# Patient Record
Sex: FEMALE | Race: WHITE | NOT HISPANIC OR LATINO | Employment: FULL TIME | URBAN - METROPOLITAN AREA
[De-identification: names, ages, dates, MRNs, and addresses within clinical notes are randomized per-mention and may not be internally consistent; named-entity substitution may affect disease eponyms.]

---

## 2020-12-27 ENCOUNTER — HOSPITAL ENCOUNTER (EMERGENCY)
Facility: HOSPITAL | Age: 60
Discharge: HOME | End: 2020-12-27
Attending: EMERGENCY MEDICINE | Admitting: EMERGENCY MEDICINE

## 2020-12-27 ENCOUNTER — APPOINTMENT (OUTPATIENT)
Dept: CT IMAGING | Facility: HOSPITAL | Age: 60
End: 2020-12-27

## 2020-12-27 VITALS
WEIGHT: 166 LBS | OXYGEN SATURATION: 99 % | BODY MASS INDEX: 27.66 KG/M2 | RESPIRATION RATE: 20 BRPM | HEART RATE: 81 BPM | TEMPERATURE: 98 F | HEIGHT: 65 IN | DIASTOLIC BLOOD PRESSURE: 93 MMHG | SYSTOLIC BLOOD PRESSURE: 147 MMHG

## 2020-12-27 DIAGNOSIS — R10.9 LEFT FLANK PAIN: Primary | ICD-10-CM

## 2020-12-27 LAB
ANION GAP SERPL CALCULATED.3IONS-SCNC: 10 MMOL/L (ref 5–15)
BACTERIA UR QL AUTO: ABNORMAL /HPF
BASOPHILS # BLD AUTO: 0 10*3/MM3 (ref 0–0.2)
BASOPHILS NFR BLD AUTO: 0.4 % (ref 0–1.5)
BILIRUB UR QL STRIP: NEGATIVE
BUN SERPL-MCNC: 17 MG/DL (ref 8–23)
BUN/CREAT SERPL: 23.9 (ref 7–25)
CALCIUM SPEC-SCNC: 9.4 MG/DL (ref 8.6–10.5)
CHLORIDE SERPL-SCNC: 100 MMOL/L (ref 98–107)
CLARITY UR: CLEAR
CO2 SERPL-SCNC: 27 MMOL/L (ref 22–29)
COLOR UR: YELLOW
CREAT SERPL-MCNC: 0.71 MG/DL (ref 0.57–1)
DEPRECATED RDW RBC AUTO: 42.4 FL (ref 37–54)
EOSINOPHIL # BLD AUTO: 0.1 10*3/MM3 (ref 0–0.4)
EOSINOPHIL NFR BLD AUTO: 0.7 % (ref 0.3–6.2)
ERYTHROCYTE [DISTWIDTH] IN BLOOD BY AUTOMATED COUNT: 13.2 % (ref 12.3–15.4)
GFR SERPL CREATININE-BSD FRML MDRD: 101 ML/MIN/1.73
GFR SERPL CREATININE-BSD FRML MDRD: 83 ML/MIN/1.73
GLUCOSE SERPL-MCNC: 101 MG/DL (ref 65–99)
GLUCOSE UR STRIP-MCNC: NEGATIVE MG/DL
HCT VFR BLD AUTO: 37.5 % (ref 34–46.6)
HGB BLD-MCNC: 12.5 G/DL (ref 12–15.9)
HGB UR QL STRIP.AUTO: NEGATIVE
HOLD SPECIMEN: NORMAL
HYALINE CASTS UR QL AUTO: ABNORMAL /LPF
KETONES UR QL STRIP: NEGATIVE
LEUKOCYTE ESTERASE UR QL STRIP.AUTO: NEGATIVE
LYMPHOCYTES # BLD AUTO: 2.4 10*3/MM3 (ref 0.7–3.1)
LYMPHOCYTES NFR BLD AUTO: 20.6 % (ref 19.6–45.3)
MCH RBC QN AUTO: 30.7 PG (ref 26.6–33)
MCHC RBC AUTO-ENTMCNC: 33.2 G/DL (ref 31.5–35.7)
MCV RBC AUTO: 92.5 FL (ref 79–97)
MONOCYTES # BLD AUTO: 0.9 10*3/MM3 (ref 0.1–0.9)
MONOCYTES NFR BLD AUTO: 7.9 % (ref 5–12)
NEUTROPHILS NFR BLD AUTO: 70.4 % (ref 42.7–76)
NEUTROPHILS NFR BLD AUTO: 8.1 10*3/MM3 (ref 1.7–7)
NITRITE UR QL STRIP: NEGATIVE
NRBC BLD AUTO-RTO: 0.1 /100 WBC (ref 0–0.2)
PH UR STRIP.AUTO: 8.5 [PH] (ref 5–8)
PLATELET # BLD AUTO: 273 10*3/MM3 (ref 140–450)
PMV BLD AUTO: 7.7 FL (ref 6–12)
POTASSIUM SERPL-SCNC: 4.1 MMOL/L (ref 3.5–5.2)
PROT UR QL STRIP: ABNORMAL
RBC # BLD AUTO: 4.06 10*6/MM3 (ref 3.77–5.28)
RBC # UR: ABNORMAL /HPF
REF LAB TEST METHOD: ABNORMAL
SODIUM SERPL-SCNC: 137 MMOL/L (ref 136–145)
SP GR UR STRIP: 1.02 (ref 1–1.03)
SQUAMOUS #/AREA URNS HPF: ABNORMAL /HPF
UROBILINOGEN UR QL STRIP: ABNORMAL
WBC # BLD AUTO: 11.5 10*3/MM3 (ref 3.4–10.8)
WBC UR QL AUTO: ABNORMAL /HPF
WHOLE BLOOD HOLD SPECIMEN: NORMAL
WHOLE BLOOD HOLD SPECIMEN: NORMAL

## 2020-12-27 PROCEDURE — 85025 COMPLETE CBC W/AUTO DIFF WBC: CPT | Performed by: EMERGENCY MEDICINE

## 2020-12-27 PROCEDURE — 74176 CT ABD & PELVIS W/O CONTRAST: CPT

## 2020-12-27 PROCEDURE — 80048 BASIC METABOLIC PNL TOTAL CA: CPT | Performed by: EMERGENCY MEDICINE

## 2020-12-27 PROCEDURE — 99283 EMERGENCY DEPT VISIT LOW MDM: CPT

## 2020-12-27 PROCEDURE — 81001 URINALYSIS AUTO W/SCOPE: CPT | Performed by: EMERGENCY MEDICINE

## 2020-12-27 RX ADMIN — SODIUM CHLORIDE 1000 ML: 9 INJECTION, SOLUTION INTRAVENOUS at 03:35

## 2020-12-27 NOTE — ED PROVIDER NOTES
Subjective   60-year-old female with recurrent kidney stones complains of severe left flank pain, onset at midnight associated with nausea and vomiting.  No clear aggravating alleviating factors.          Review of Systems   Gastrointestinal: Positive for abdominal pain, nausea and vomiting.   Genitourinary: Positive for flank pain.   All other systems reviewed and are negative.      No past medical history on file.    No Known Allergies    No past surgical history on file.    No family history on file.    Social History     Socioeconomic History   • Marital status:      Spouse name: Not on file   • Number of children: Not on file   • Years of education: Not on file   • Highest education level: Not on file           Objective   Physical Exam  Constitutional:       Appearance: Normal appearance.   HENT:      Head: Normocephalic and atraumatic.      Mouth/Throat:      Mouth: Mucous membranes are moist.      Pharynx: Oropharynx is clear.   Eyes:      Conjunctiva/sclera: Conjunctivae normal.      Pupils: Pupils are equal, round, and reactive to light.   Neck:      Musculoskeletal: Normal range of motion and neck supple.   Cardiovascular:      Rate and Rhythm: Normal rate and regular rhythm.      Heart sounds: Normal heart sounds.   Pulmonary:      Effort: Pulmonary effort is normal.      Breath sounds: Normal breath sounds.   Abdominal:      General: Bowel sounds are normal. There is no distension.      Palpations: Abdomen is soft.      Tenderness: There is no abdominal tenderness.   Musculoskeletal: Normal range of motion.         General: No swelling or tenderness.   Skin:     General: Skin is warm and dry.      Capillary Refill: Capillary refill takes less than 2 seconds.   Neurological:      General: No focal deficit present.      Mental Status: She is alert and oriented to person, place, and time.   Psychiatric:         Mood and Affect: Mood normal.         Behavior: Behavior normal.         Procedures            ED Course                                           MDM  Number of Diagnoses or Management Options  Left flank pain:   Diagnosis management comments: Results for orders placed or performed during the hospital encounter of 12/27/20  -Urinalysis With Microscopic If Indicated (No Culture) - Urine, Clean Catch  Specimen: Urine, Clean Catch       Result                      Value             Ref Range           Color, UA                   Yellow            Yellow, Straw       Appearance, UA              Clear             Clear               pH, UA                      8.5 (H)           5.0 - 8.0           Specific [place], UA        1.017             1.005 - 1.030       Glucose, UA                 Negative          Negative            Ketones, UA                 Negative          Negative            Bilirubin, UA               Negative          Negative            Blood, UA                   Negative          Negative            Protein, UA                                   Negative        30 mg/dL (1+) (A)       Leuk Esterase, UA           Negative          Negative            Nitrite, UA                 Negative          Negative            Urobilinogen, UA            0.2 E.U./dL       0.2 - 1.0 E.*  -Basic Metabolic Panel  Specimen: Blood       Result                      Value             Ref Range           Glucose                     101 (H)           65 - 99 mg/dL       BUN                         17                8 - 23 mg/dL        Creatinine                  0.71              0.57 - 1.00 *       Sodium                      137               136 - 145 mm*       Potassium                   4.1               3.5 - 5.2 mm*       Chloride                    100               98 - 107 mmo*       CO2                         27.0              22.0 - 29.0 *       Calcium                     9.4               8.6 - 10.5 m*       eGFR  African Amer          101               >60 mL/min/1*       eGFR Non African  Amer       83                >60 mL/min/1*       BUN/Creatinine Ratio        23.9              7.0 - 25.0          Anion Gap                   10.0              5.0 - 15.0 m*  -CBC Auto Differential  Specimen: Blood       Result                      Value             Ref Range           WBC                         11.50 (H)         3.40 - 10.80*       RBC                         4.06              3.77 - 5.28 *       Hemoglobin                  12.5              12.0 - 15.9 *       Hematocrit                  37.5              34.0 - 46.6 %       MCV                         92.5              79.0 - 97.0 *       MCH                         30.7              26.6 - 33.0 *       MCHC                        33.2              31.5 - 35.7 *       RDW                         13.2              12.3 - 15.4 %       RDW-SD                      42.4              37.0 - 54.0 *       MPV                         7.7               6.0 - 12.0 fL       Platelets                   273               140 - 450 10*       Neutrophil %                70.4              42.7 - 76.0 %       Lymphocyte %                20.6              19.6 - 45.3 %       Monocyte %                  7.9               5.0 - 12.0 %        Eosinophil %                0.7               0.3 - 6.2 %         Basophil %                  0.4               0.0 - 1.5 %         Neutrophils, Absolute       8.10 (H)          1.70 - 7.00 *       Lymphocytes, Absolute       2.40              0.70 - 3.10 *       Monocytes, Absolute         0.90              0.10 - 0.90 *       Eosinophils, Absolute       0.10              0.00 - 0.40 *       Basophils, Absolute         0.00              0.00 - 0.20 *       nRBC                        0.1               0.0 - 0.2 /1*  -Urinalysis, Microscopic Only - Urine, Clean Catch  Specimen: Urine, Clean Catch       Result                      Value             Ref Range           RBC, UA                     3-5 (A)           None Seen /H*        WBC, UA                     3-5 (A)           None Seen /H*       Bacteria, UA                Trace (A)         None Seen /H*       Squamous Epithelial Ce*     3-6 (A)           None Seen, 0*       Hyaline Casts, UA           None Seen         None Seen /L*       Methodology                                                   Manual Light Microscopy  Ct Abdomen Pelvis Without Contrast    Result Date: 12/27/2020  Somewhat limited exam due to lack of IV contrast. No acute abdominal or pelvic process seen. No urinary tract stones. Colonic diverticulosis without acute diverticulitis. Small fat-containing umbilical hernia. Electronically signed by:  Tc Hope M.D.  12/27/2020 1:22 AM      Unremarkable work-up in a patient without any symptoms currently.  Patient stressed to return if there is any significant recurrence of pain otherwise follow-up close with her family doctor.       Amount and/or Complexity of Data Reviewed  Clinical lab tests: reviewed  Tests in the radiology section of CPT®: reviewed        Final diagnoses:   Left flank pain            Bebo Elizalde MD  12/27/20 0352

## 2020-12-27 NOTE — ED NOTES
Pt is waiting in room for cab. discharge information already reviewed.      Katherine Anders RN  12/27/20 2980